# Patient Record
Sex: FEMALE | Race: WHITE | Employment: OTHER | URBAN - METROPOLITAN AREA
[De-identification: names, ages, dates, MRNs, and addresses within clinical notes are randomized per-mention and may not be internally consistent; named-entity substitution may affect disease eponyms.]

---

## 2018-08-21 ENCOUNTER — TELEPHONE (OUTPATIENT)
Dept: CARDIOLOGY CLINIC | Age: 23
End: 2018-08-21

## 2018-08-21 NOTE — TELEPHONE ENCOUNTER
Faxed records request to [facility]. Future Appointments  Date Time Provider [facility]   8/22/2018 9:40 AM Kayla Blanchard  [address] 8/31/2018 8:00 AM Kacey Cid MD [address]      8/22/18- Received records.

## 2018-08-22 ENCOUNTER — TELEPHONE (OUTPATIENT)
Dept: CARDIOLOGY CLINIC | Age: 23
End: 2018-08-22

## 2018-08-22 ENCOUNTER — OFFICE VISIT (OUTPATIENT)
Dept: CARDIOLOGY CLINIC | Age: 23
End: 2018-08-22

## 2018-08-22 VITALS
BODY MASS INDEX: 23.35 KG/M2 | SYSTOLIC BLOOD PRESSURE: 116 MMHG | HEART RATE: 89 BPM | OXYGEN SATURATION: 98 % | HEIGHT: 63 IN | DIASTOLIC BLOOD PRESSURE: 78 MMHG | WEIGHT: 131.8 LBS

## 2018-08-22 DIAGNOSIS — R00.0 TACHYCARDIA: ICD-10-CM

## 2018-08-22 DIAGNOSIS — R06.02 SOB (SHORTNESS OF BREATH): Primary | ICD-10-CM

## 2018-08-22 RX ORDER — RIZATRIPTAN BENZOATE 10 MG/1
TABLET, ORALLY DISINTEGRATING ORAL
COMMUNITY
Start: 2018-07-01 | End: 2019-04-24 | Stop reason: SDUPTHER

## 2018-08-22 RX ORDER — NORETHINDRONE ACETATE AND ETHINYL ESTRADIOL 5-7-9-7
KIT ORAL
COMMUNITY
Start: 2018-06-25

## 2018-08-22 NOTE — PROGRESS NOTES
HISTORY OF PRESENT ILLNESS  Johnny Pizano is a 26 y.o. female. She is here for evaluation of shortness of breath and fast heart rate. She is a nursing student at [employer]. She has had repeat concussions and had her fourth concussion on 8/5/18 when she bumped heads with a friend at camp. She had headaches and dizziness after. She was diagnosed as having a mild concussion without a CT scan. She was told to lie in a dark room which she has been doing for the past week and a half. Several days ago, she felt winded and faint. Her heart rate was fast at 105-120. She went to [facility] and drove herself there. I have reviewed all of the records from there. Her heart rate was as fast as 152 beats per minute in what appeared to be sinus tachycardia. In retrospect, she thinks she has been having a fast heart rate and shortness of breath for about a month. She denies anxiety. She had been athletic and runs up to three miles at a time. She denies a family history of heart disease. She does not smoke cigarettes. She drinks alcohol on occasion. She had a CT scan done at [facility] which showed no evidence for pulmonary embolism. She received two liters of IV fluid with gradual slowing of her heart rate there though she denies dehydration. HPI  Patient Active Problem List   Diagnosis Code    Tachycardia R00.0    SOB (shortness of breath) R06.02     Current Outpatient Prescriptions   Medication Sig Dispense Refill    TRI-LEGEST FE 1-20(5)/1-30(7) /1mg-35mcg (9) tab       rizatriptan (MAXALT-MLT) 10 mg disintegrating tablet        Past Medical History:   Diagnosis Date    ACL (anterior cruciate ligament) tear     Concussion      Past Surgical History:   Procedure Laterality Date    HX ACL RECONSTRUCTION         Review of Systems   Respiratory: Positive for shortness of breath. Cardiovascular: Positive for palpitations. Neurological: Positive for dizziness and headaches.    All other systems reviewed and are negative. Visit Vitals    /78 (BP 1 Location: Right arm, BP Patient Position: Sitting)    Pulse 89    Ht 5' 3\" (1.6 m)    Wt 131 lb 12.8 oz (59.8 kg)    SpO2 98%    BMI 23.35 kg/m2       Physical Exam   Constitutional: She is oriented to person, place, and time. She appears well-nourished. HENT:   Head: Atraumatic. Eyes: Conjunctivae are normal.   Neck: Neck supple. Cardiovascular: Normal rate, regular rhythm and normal heart sounds. Exam reveals no gallop and no friction rub. No murmur heard. Pulmonary/Chest: Breath sounds normal. She has no wheezes. Abdominal: Bowel sounds are normal.   Musculoskeletal: She exhibits no edema. Neurological: She is oriented to person, place, and time. Skin: Skin is dry. Psychiatric: Her behavior is normal.   Nursing note and vitals reviewed. ASSESSMENT and PLAN  I doubt there is any significant underlying heart disease but it is puzzling why her heart rate goes fast and persisted in a fast manner despite IV fluids. She could have sinus node re-entry with automatic tachycardia. She would like to get to the bottom of this so I will give her a 30-day event monitor and will also have her undergo an echocardiogram and call her regarding the results. Follow up will be scheduled as necessary.

## 2018-08-22 NOTE — MR AVS SNAPSHOT
[address] [facility] [address] [facility] 
[phone] Patient: Linda Brown MRN: [id number] :1995 Visit Information Date & Time Provider Department Dept. Phone Encounter #  
 2018  9:40 AM Machelle Lyons MD [facility] [place] [phone] [id number] Your Appointments 2018  8:00 AM  
New Patient with Allen Matson MD  
[facility] [address]) Appt Note: New Pt/ Pt Has Headaches [address] [facility], 
[address] [facility], [address] [phone]  
[address], [address], [address] [phone] Upcoming Health Maintenance Date Due  
 HPV Age 9Y-34Y (1 of 3 - Female 3 Dose Series) 2007 DTaP/Tdap/Td series (1 - Tdap) 2017 PAP AKA CERVICAL CYTOLOGY 2017 Influenza Age 5 to Adult 2018 Allergies as of 2018  Review Complete On: 2018 By: Rocael Jeong Severity Noted Reaction Type Reactions Sulfa (Sulfonamide Antibiotics)  2018    Rash Current Immunizations  Never Reviewed No immunizations on file. Not reviewed this visit Vitals BP Pulse Height(growth percentile) Weight(growth percentile) SpO2 BMI  
 116/78 (BP 1 Location: Right arm, BP Patient Position: Sitting) 89 5' 3\" (1.6 m) 131 lb 12.8 oz (59.8 kg) 98% 23.35 kg/m2 Smoking Status Never Smoker Vitals History BMI and BSA Data Body Mass Index Body Surface Area  
 23.35 kg/m 2 1.63 m 2 Your Updated Medication List  
  
   
This list is accurate as of 18 10:15 AM.  Always use your most recent med list.  
  
  
  
  
 rizatriptan 10 mg disintegrating tablet Commonly known as:  MAXALT-MLT  
  
 TRI-LEGEST FE 1-20(5)/1-30(7) /1mg-35mcg (9) Tab Generic drug:  norethindrone-e estradiol-iron Introducing [facility]! [employer] introduces [employer] patient portal. Now you can access parts of your medical record, email your doctor's office, and request medication refills online. 1. In your internet browser, go to [email or web address] 2. Click on the First Time User? Click Here link in the Sign In box. You will see the New Member Sign Up page. 3. Enter your [employer] Access Code exactly as it appears below. You will not need to use this code after youve completed the sign-up process. If you do not sign up before the expiration date, you must request a new code. · [employer] Access Code: [email or web address] Expires: 11/20/2018 10:15 AM 
 
4. Enter the last four digits of your Social Security Number (xxxx) and Date of Birth (mm/dd/yyyy) as indicated and click Submit. You will be taken to the next sign-up page. 5. Create a [employer] ID. This will be your [employer] login ID and cannot be changed, so think of one that is secure and easy to remember. 6. Create a [employer] password. You can change your password at any time. 7. Enter your Password Reset Question and Answer. This can be used at a later time if you forget your password. 8. Enter your e-mail address. You will receive e-mail notification when new information is available in [address] 9. Click Sign Up. You can now view and download portions of your medical record. 10. Click the Download Summary menu link to download a portable copy of your medical information. If you have questions, please visit the Frequently Asked Questions section of the [employer] website. Remember, [employer] is NOT to be used for urgent needs. For medical emergencies, dial 911. Now available from your iPhone and Android! Please provide this summary of care documentation to your next provider. If you have any questions after today's visit, please call [phone].

## 2018-08-22 NOTE — TELEPHONE ENCOUNTER
Dr. Kasia Collazo would like to have a 30 day event monitor mailed to patient. Dx: fast heart rate, SOB. Thanks.

## 2018-08-31 ENCOUNTER — OFFICE VISIT (OUTPATIENT)
Dept: NEUROLOGY | Age: 23
End: 2018-08-31

## 2018-08-31 VITALS
OXYGEN SATURATION: 98 % | DIASTOLIC BLOOD PRESSURE: 70 MMHG | WEIGHT: 132 LBS | HEIGHT: 63 IN | BODY MASS INDEX: 23.39 KG/M2 | SYSTOLIC BLOOD PRESSURE: 120 MMHG | HEART RATE: 82 BPM

## 2018-08-31 DIAGNOSIS — R53.83 MALAISE AND FATIGUE: ICD-10-CM

## 2018-08-31 DIAGNOSIS — G43.709 CHRONIC MIGRAINE WITHOUT AURA WITHOUT STATUS MIGRAINOSUS, NOT INTRACTABLE: ICD-10-CM

## 2018-08-31 DIAGNOSIS — R42 DIZZINESS AND GIDDINESS: ICD-10-CM

## 2018-08-31 DIAGNOSIS — R53.81 MALAISE AND FATIGUE: ICD-10-CM

## 2018-08-31 DIAGNOSIS — S06.0X0A CONCUSSION WITHOUT LOSS OF CONSCIOUSNESS, INITIAL ENCOUNTER: Primary | ICD-10-CM

## 2018-08-31 RX ORDER — MECLIZINE HYDROCHLORIDE 25 MG/1
25 TABLET ORAL
Qty: 30 TAB | Refills: 3 | Status: SHIPPED | OUTPATIENT
Start: 2018-08-31 | End: 2018-09-10

## 2018-08-31 NOTE — PATIENT INSTRUCTIONS
Office Policies  o Phone calls/patient messages:  Please allow up to 24 hours for someone in the office to contact you about your call or message. Be mindful your provider may be out of the office or your message may require further review. We encourage you to use [employer] [facility] for your messages as this is a faster, more efficient way to communicate with our office  o Medication Refills:  Prescription medications require up to 48 business hours to process. We encourage you to use [employer] for your refills. For controlled medications: Please allow up to 72 business hours to process. Certain medications may require you to  a written prescription at our office. NO narcotic/controlled medications will be prescribed after 4pm Monday through Friday or on weekends  o Form/Paperwork Completion:  Please note there is a $25 fee for all paperwork completed by our providers. We ask that you allow 7-14 business days. Pre-payment is due prior to picking up/faxing the completed form. You may also download your forms to [employer] to have your doctor print off.  o Test Results: In order for a patient to obtain test results, an appointment must be made with the physician to review the results. Test results cannot be discussed over the phone.

## 2018-08-31 NOTE — PROGRESS NOTES
NEUROLOGY HISTORY AND PHYSICAL    Name Leonid Alvarado Age 26 y.o. MRN [id number]  1995     Referring Physician: No primary care provider on file. Chief Complaint:  concussion     This is a 26 y.o. four concussion. 1st concussion 2015. Right temple developed nausea fatigue. Recovered a month later. Second concussion 2017 hit her head on break fireplace no LOC. She had toruble with vision, brain fog, balance issues and fatigue, no tinnitus. Recovered. 2017 she hit her right head against a shoulder of a player while diving for a ball. She became sonophobic, nauseous, needed a full month to recover. August fourth had a fourth concussion she was hit in the back of the head and developed nausea. She continues to struggle with some fatigue and dizziness. She has no visual issues. She feels she is improving. Assessment and Plan  1. Concussion without loss of consciousness, initial encounter  · Discussed the overwhelmingly favorable prognosis of concussion and the transient nature of the symptoms in well over 90% of the cases  · Discussed the necessity of obtaining a head imaging and EEG for evaluation of concussion  · Discussed the importance of avoiding head trauma during the recovery    - meclizine (ANTIVERT) 25 mg tablet; Take 1 Tab by mouth three (3) times daily as needed for up to 10 days. Dispense: 30 Tab; Refill: 3    2. Dizziness and giddiness  - meclizine (ANTIVERT) 25 mg tablet; Take 1 Tab by mouth three (3) times daily as needed for up to 10 days. Dispense: 30 Tab; Refill: 3    3. Chronic migraine without aura without status migrainosus, not intractable  Continue maxalt as needed.  Current frequency once a month    4. Malaise and fatigue  Avoid excessive activity while recovering from concussion      Patient Allergies  Sulfa (sulfonamide antibiotics)     Current Outpatient Prescriptions   Medication Sig    TRI-LEGEST FE 1-20(5)/1-30(7) /1mg-35mcg (9) tab     rizatriptan (MAXALT-MLT) 10 mg disintegrating tablet      No current facility-administered medications for this visit. Past Medical History:   Diagnosis Date    ACL (anterior cruciate ligament) tear     Concussion        Social History   Substance Use Topics    Smoking status: Never Smoker    Smokeless tobacco: Never Used    Alcohol use No       Family History   Problem Relation Age of Onset    Breast Cancer Mother      Review of Systems   Constitutional: Negative for chills and fever. HENT: Negative for ear pain. Eyes: Negative for pain and discharge. Respiratory: Negative for cough and hemoptysis. Cardiovascular: Negative for chest pain and claudication. Gastrointestinal: Negative for constipation and diarrhea. Genitourinary: Negative for flank pain and hematuria. Musculoskeletal: Negative for back pain and myalgias. Skin: Negative for itching and rash. Neurological: Negative for headaches. Endo/Heme/Allergies: Negative for environmental allergies. Does not bruise/bleed easily. Psychiatric/Behavioral: Negative for depression and hallucinations. Exam  Visit Vitals    /70    Pulse 82    Ht 5' 3\" (1.6 m)    Wt 132 lb (59.9 kg)    SpO2 98%    BMI 23.38 kg/m2      General: Well developed, well nourished. Patient in no apparent distress   Head: Normocephalic, atraumatic, anicteric sclera   Neck Normal ROM, No thyromegally   Lungs:  Clear to auscultation bilaterally, No wheezes or rubs   Cardiac: Regular rate and rhythm with no murmurs. Abd: Bowel sounds were audible. No tenderness on palpation   Ext: No pedal edema   Skin: Supple no rash     NeurologicExam:  Mental Status: Alert and oriented to person place and time   Speech: Fluent no aphasia or dysarthria. Cranial Nerves:  II - XII Intact   Motor:  Full and symmetric strength of upper and lower proximal and distal muscles. Normal bulk and tone. Reflexes:   Deep tendon reflexes 2+/4 and symmetric.    Sensory:   Symmetric and intact with no perceived deficits modalities involving small or large fibers. Gait:  Gait is balanced and fluid with normal arm swing. Tremor:   No tremor noted. Cerebellar:  Coordination intact. Neurovascular: No carotid bruits.  No JVD

## 2018-09-14 ENCOUNTER — TELEPHONE (OUTPATIENT)
Dept: CARDIOLOGY CLINIC | Age: 23
End: 2018-09-14

## 2018-09-14 NOTE — TELEPHONE ENCOUNTER
Event monitor ordered by Dr Gabbie Gómez. Per Elvira monitor mailed to patient but never used. Monitor mailed back to [employer]. Appointment cancelled and no change placed.

## 2018-10-02 ENCOUNTER — HOSPITAL ENCOUNTER (OUTPATIENT)
Dept: PHYSICAL THERAPY | Age: 23
Discharge: HOME | End: 2018-10-02
Attending: GENERAL ACUTE CARE HOSPITAL
Payer: COMMERCIAL

## 2018-10-02 PROCEDURE — 97110 THERAPEUTIC EXERCISES: CPT

## 2018-10-02 PROCEDURE — 97161 PT EVAL LOW COMPLEX 20 MIN: CPT

## 2018-10-02 NOTE — PROGRESS NOTES
PT INITIAL EVALUATION NOTE - [facility] 2-15 Patient Name: Erna Dias Date:10/2/2018 : 1995 [x]  Patient  Verified Payor: /   
In time:1:10pm80  Out time:2:30pm 
Total Treatment Time (min): 15 Total Timed Codes (min): 15 
1:1 Treatment Time (min): 15 Visit #: 1 Treatment Area: Concussion [S06.0X9A] Neck pain [M54.2] SUBJECTIVE Pain Level (0-10 scale): 2/10 Any medication changes, allergies to medications, adverse drug reactions, diagnosis change, or new procedure performed?: [] No    [x] Yes (see summary sheet for update) Subjective:   
Pt reports history of 4 concussions in the past 4 years- most recent was 2018- pt was struck on left side of head while giving someone a hug. Pt did not notice symptoms immediately but felt dizziness, fatigue and increased frequency of HAs (has migraines since 1st concussion). 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 2015, hit front of head on a friend's clavicle; 2017, hit back of head on brick wall; 2017, hit right side of head on a friend's shoulder. All previous concussions spontaneously got better without 3-4 weeks. Pt adhered to neurologist recommendation for cognitive rest for 10 days. 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 Pt denies any difficulty with school. Primary complaint is dizziness, especially when lying down or extending head. 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 \" 
Barriers: previous concussion Motivation: high Substance use: none FABQ Score: low OBJECTIVE/EXAMINATION Observations: 
Posture: rounded shoulders Gait: unremarkable Functional Mobility: unremarkable Palpation: tenderness to palpation base of occiput, bilateral UTs Cervical AROM: full, pain-free Strength: 
UE: Grossly 5/5 LE: Grossly 5/5 Vision: 
 Spontaneous Nystagmus: negative Gaze Hold Nystagmus: negative Occulomotor control (H pattern): negative Smooth Pursuit (H pattern): negative Convergence: positive Saccades (shift eyes bw 2 targets): positive Visual Acuity: NT  
 Gaze stabilization (hold eyes on stable target while moving head): positive (horizontal > vertical) Skew Eye Deviation: NT Vestibular Function: VOR: slow head movements: positive VOR: fast head movements: positive Head Thrust: positive Cerebellar Function:  
 Finger to nose: negative Pointing/ past pointing: negative Rapid forearm supination/pronation: negative VOR Cancellation: positive Vertebral Artery Test: NT 
BPPV: 
Cornelia Hallpike: positive left Roll Test: NT Head Hang: NT  
Cervical Tests: 
 Alar Ligament Test: negative Sharp Abida Test: negative Traction Test: negative Neck Torsion Test: negative Smooth Pursuit Neck Torsion Test: negative Balance Tests: BOSSMAN Test (number of mistakes listed below)= 2 Non-compliant surface (blue foam) Feet Together:  EC 0 Single Leg  EC  L 0 Rhomberg:  EC 0 Compliant Surface Feet Together: EC 0 Single Leg   L 1 Rhomberg:  EC 1 Mobility Assessment: unremarkable 10 min Therapeutic Exercise:  [x] See flow sheet :  
Rationale: improve coordination and improve balance and decrease dizziness With 
 [] TE 
 [] TA 
 [] neuro 
 [] other: Patient Education: [x] Review HEP- pt given  
[] Progressed/Changed HEP based on:  
[] positioning   [] body mechanics   [] transfers   [] heat/ice application   
[] other:   
 
Other Objective/Functional Measures: FOTO: 58/100 Pain Level (0-10 scale) post treatment: 0/10 ASSESSMENT/Changes in Function:  
Pt is a 26year old female who presents to PT by [facility] for post-concussion syndrome; however, signs and symptoms are consistent with left BPPV vs concussion. Baseline diplopia and HAs. Static balance is good on even and uneven surfaces. Pt treated with Epley maneuver and reported definite improvement upon leaving. Pt given handout for HEP- Epley maneuver at home 3x/day until symptom-free. Patient will benefit from skilled PT services to modify and progress therapeutic interventions, address functional mobility deficits, analyze and address soft tissue restrictions, analyze and cue movement patterns, analyze and modify body mechanics/ergonomics, assess and modify postural abnormalities and address imbalance/dizziness to attain pt/PT goals. [x]  See Plan of Care Eduardo Vasquez, PT, DPT  10/2/2018  1:11 PM

## 2018-10-02 NOTE — PROGRESS NOTES
[facility] Physical Therapy [facility], [address] [place], [address] Phone: [phone]  Fax: [phone] Plan of Care/Statement of Necessity for Physical Therapy Services  2-15 Patient name: Johnathon Elizabeth  : 1995  Provider#: [id number] Referral source: [facility], Not On File, MD     
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 
 
Start of Care: 10/02/2018      Onset Date: 2018 The Plan of Care and following information is based on the information from the initial evaluation. Assessment/ key information: Pt is a 26year old female who presents to PT by [facility] for post-concussion syndrome; however, signs and symptoms are consistent with left BPPV vs concussion. Baseline diplopia and HAs. Static balance is good on even and uneven surfaces. Pt treated with Epley maneuver and reported definite improvement upon leaving initial visit. Pt given handout for HEP- Epley maneuver at home 3x/day until symptom-free. Patient will benefit from skilled PT services to modify and progress therapeutic interventions, address functional mobility deficits, analyze and address soft tissue restrictions, analyze and cue movement patterns, analyze and modify body mechanics/ergonomics, assess and modify postural abnormalities and address imbalance/dizziness to attain pt/PT goals.  
 
Evaluation Complexity History MEDIUM  Complexity : 1-2 comorbidities / personal factors will impact the outcome/ POC ; Examination HIGH Complexity : 4+ Standardized tests and measures addressing body structure, function, activity limitation and / or participation in recreation ;Presentation LOW Complexity : Stable, uncomplicated  ;Clinical Decision Making MEDIUM Complexity : FOTO score of 26-74 Overall Complexity Rating: LOW Problem List: pain affecting function, impaired gait/ balance, decrease ADL/ functional abilitiies, decrease activity tolerance and decrease transfer abilities Treatment Plan may include any combination of the following: Therapeutic exercise, Therapeutic activities, Neuromuscular re-education, Physical agent/modality, Gait/balance training, Manual therapy and Patient education Patient / Family readiness to learn indicated by: asking questions, trying to perform skills and interest 
Persons(s) to be included in education: patient (P) Barriers to Learning/Limitations: None Patient Goal (s): Back to all normal life activities, elimination of symptoms.  Patient Self Reported Health Status: excellent Rehabilitation Potential: good Short/Long Term Goals: To be accomplished in 6 weeks: 
1) Independent with HEP. 2) Tolerate return to physical activity without symptoms. 3) Pt will report resolution of dizziness. 4) Pt will report improvement in overall functional mobility, as measured by FOTO, with an increased score of at least 17 points, from 58 to 75. Frequency / Duration: Patient to be seen 2 times per week for 6 weeks. Patient/ Caregiver education and instruction: self care, activity modification and exercises 
 
[x]  Plan of care has been reviewed with LUIS Vasquez PT, DPT  10/2/2018 1:12 PM 
 
________________________________________________________________________ I certify that the above Therapy Services are being furnished while the patient is under my care. I agree with the treatment plan and certify that this therapy is necessary. [de-identified] Signature:____________________  Date:____________Time: _________

## 2018-10-08 ENCOUNTER — HOSPITAL ENCOUNTER (OUTPATIENT)
Dept: PHYSICAL THERAPY | Age: 23
Discharge: HOME | End: 2018-10-08
Attending: GENERAL ACUTE CARE HOSPITAL
Payer: COMMERCIAL

## 2018-10-08 PROCEDURE — 97110 THERAPEUTIC EXERCISES: CPT | Performed by: PHYSICAL THERAPIST

## 2018-10-08 PROCEDURE — 97140 MANUAL THERAPY 1/> REGIONS: CPT | Performed by: PHYSICAL THERAPIST

## 2018-10-08 NOTE — PROGRESS NOTES
PT DAILY TREATMENT NOTE - [facility] 2-15 Patient Name: Nilda Valdovinos Date:10/8/2018 : 1995 [x]  Patient  Verified Payor: BLUE CROSS / Plan: [facility] PPO / Product Type: PPO / In time:135p  Out time:235p Total Treatment Time (min): 60 Total Timed Codes (min): 60 
1:1 Treatment Time ([address] only): 60 Visit #: 2 Treatment Area: Concussion [S06.0X9A] Neck pain [M54.2] SUBJECTIVE Pain Level (0-10 scale): 1/10 Any medication changes, allergies to medications, adverse drug reactions, diagnosis change, or new procedure performed?: [x] No    [] Yes (see summary sheet for update) Subjective functional status/changes:   [] No changes reported \"Dizziness is like I am on a ship not the room spinning\". The home vertigo exercises made her worse. She stopped them after 2 days. She is now back to where she was before her fist session. Yoga was helpful. OBJECTIVE 30 min Therapeutic Exercise:  [x] See flow sheet : review of current exercise and fitness activities and recommendations Rationale: increase ROM, increase strength, improve coordination, improve balance and increase proprioception to improve the patients ability to resume normal activity 30 min Manual Therapy: assessment of cervical mobilitySTM bilateral UT, cervical paraspinal and sub-occipital regions Rationale: decrease pain, increase ROM and increase tissue extensibility to improve the patients ability to resume physical activity and look over shoulders With 
 [] TE 
 [] TA 
 [] neuro 
 [] other: Patient Education: [x] Review HEP [] Progressed/Changed HEP based on:  
[] positioning   [] body mechanics   [] transfers   [] heat/ice application   
[] other:   
 
Other Objective/Functional Measures: AROM Cervical Rotation R 72  L 60  SB R 35   L 35;   
 
Pain Level (0-10 scale) post treatment: 0/10 ASSESSMENT/Changes in Function: Based on her response to HEP Epley activity and limitation present in cervical mobility we shifted to cervicogenic origin focus with success today. Pt reported less headache and neck pain after treatment. Patient will continue to benefit from skilled PT services to modify and progress therapeutic interventions, address functional mobility deficits, address ROM deficits, address strength deficits, analyze and address soft tissue restrictions, analyze and cue movement patterns, analyze and modify body mechanics/ergonomics and assess and modify postural abnormalities to attain remaining goals. []  See Plan of Care 
[]  See progress note/recertification 
[]  See Discharge Summary Progress towards goals / Updated goals: 
Short/Long Term Goals: To be accomplished in 6 weeks: 
1) Independent with HEP. 2) Tolerate return to physical activity without symptoms. 3) Pt will report resolution of dizziness. 4) Pt will report improvement in overall functional mobility, as measured by FOTO, with an increased score of at least 17 points, from 58 to 75. 
  
 
PLAN [x]  Upgrade activities as tolerated     [x]  Continue plan of care 
[]  Update interventions per flow sheet      
[]  Discharge due to:_ 
[]  Other:_   
 
Carolin Calderon PT, DPT 10/8/2018  1:37 PM

## 2018-10-12 ENCOUNTER — HOSPITAL ENCOUNTER (OUTPATIENT)
Dept: PHYSICAL THERAPY | Age: 23
Discharge: HOME | End: 2018-10-12
Attending: GENERAL ACUTE CARE HOSPITAL
Payer: COMMERCIAL

## 2018-10-12 PROCEDURE — 97140 MANUAL THERAPY 1/> REGIONS: CPT | Performed by: PHYSICAL THERAPIST

## 2018-10-12 PROCEDURE — 97110 THERAPEUTIC EXERCISES: CPT | Performed by: PHYSICAL THERAPIST

## 2018-10-12 NOTE — PROGRESS NOTES
PT DAILY TREATMENT NOTE - [facility] 2-15 Patient Name: Joel Addison Date:10/12/2018 : 1995 [x]  Patient  Verified Payor: BLUE CROSS / Plan: [facility] PPO / Product Type: PPO / In time:800a  Out time:845a Total Treatment Time (min): 45 Total Timed Codes (min): 30 
1:1 Treatment Time ( only): 30 Visit #: 3 Treatment Area: Concussion [S06.0X9A] Neck pain [M54.2] SUBJECTIVE Pain Level (0-10 scale): 0/10 Any medication changes, allergies to medications, adverse drug reactions, diagnosis change, or new procedure performed?: [x] No    [] Yes (see summary sheet for update) Subjective functional status/changes:   [] No changes reported Pt states that she did have more dizziness after last session. The day after she felt good and has done well since. \"Yesterday she felt the best she has felt yet. \"  The mor she turns her head the more she feels dizzy. OBJECTIVE 
 
 
  
30 min Therapeutic Exercise:  [x] See flow sheet : review of current exercise and fitness activities and recommendations Rationale: increase ROM, increase strength, improve coordination, improve balance and increase proprioception to improve the patients ability to resume normal activity 
  
  
15 min Manual Therapy: assessment of cervical mobilitySTM bilateral UT, cervical paraspinal and sub-occipital regions, right C5/6 and C6/7 upslide and left C4/C5 downslide mob grade 3-4 Rationale: decrease pain, increase ROM and increase tissue extensibility to improve the patients ability to resume physical activity and look over shoulders 
  
   
With 
 [] TE 
 [] TA 
 [] neuro 
 [] other: Patient Education: [x] Review HEP [] Progressed/Changed HEP based on:  
[] positioning   [] body mechanics   [] transfers   [] heat/ice application   
[] other:   
  
Other Objective/Functional Measures: AROM Cervical Rotation R 80  L 72  SB R 35   L 35;              
  
Pain Level (0-10 scale) post treatment: 0/10 
  
 ASSESSMENT/Changes in Function:  
Cervical mobility improved following manual treatment. Instructed her in self right facet upslide mob which also helped. If she does tolerate the cervical exercise well then consider visual training next session. Patient will continue to benefit from skilled PT services to modify and progress therapeutic interventions, address functional mobility deficits, address ROM deficits, address strength deficits, analyze and address soft tissue restrictions, analyze and cue movement patterns, analyze and modify body mechanics/ergonomics and assess and modify postural abnormalities to attain remaining goals. 
   
[]  See Plan of Care 
[]  See progress note/recertification 
[]  See Discharge Summary 
   
Progress towards goals / Updated goals: 
Short/Long Term Goals: To be accomplished in 6 weeks: 
1) Independent with HEP. 2) Tolerate return to physical activity without symptoms. 3) Pt will report resolution of dizziness. 4) Pt will report improvement in overall functional mobility, as measured by FOTO, with an increased score of at least 17 points, from 58 to 75. 
  
  
PLAN [x]  Upgrade activities as tolerated     [x]  Continue plan of care 
[]  Update interventions per flow sheet      
[]  Discharge due to:_ 
[]  Other:_   
  
 
Amraúl Smoker, PT, DPT 10/12/2018  8:02 AM

## 2018-10-16 ENCOUNTER — HOSPITAL ENCOUNTER (OUTPATIENT)
Dept: PHYSICAL THERAPY | Age: 23
Discharge: HOME | End: 2018-10-16
Attending: GENERAL ACUTE CARE HOSPITAL
Payer: COMMERCIAL

## 2018-10-16 PROCEDURE — 97140 MANUAL THERAPY 1/> REGIONS: CPT

## 2018-10-16 PROCEDURE — 97110 THERAPEUTIC EXERCISES: CPT

## 2018-10-16 NOTE — PROGRESS NOTES
PT DAILY TREATMENT NOTE - [facility] 2-15 Patient Name: Donna Ferro Date:10/16/2018 : 1995 [x]  Patient  Verified Payor: BLUE CROSS / Plan: [facility] PPO / Product Type: PPO / In time:800a  Out time:845a Total Treatment Time (min): 45 Total Timed Codes (min): 30 
1:1 Treatment Time ( only): 30 Visit #: 3 Treatment Area: Concussion [S06.0X9A] Neck pain [M54.2] SUBJECTIVE Pain Level (0-10 scale): 0/10 Any medication changes, allergies to medications, adverse drug reactions, diagnosis change, or new procedure performed?: [x] No    [] Yes (see summary sheet for update) Subjective functional status/changes:   [] No changes reported Pt reports continued improvement in dizziness. Pt able to jog 1/2 mile without difficulty. Pt feels that yoga and postural exercises are helping. Pt maintains that dizziness is worst when she lies down in the bed at night. OBJECTIVE 30 min Therapeutic Exercise:  [x] See flow sheet : review of current exercise and fitness activities and recommendations Rationale: increase ROM, increase strength, improve coordination, improve balance and increase proprioception to improve the patients ability to resume normal activity 
  
15 min Manual Therapy: STM bilateral UT, cervical paraspinal and sub-occipital regions, gentle cervical traction; suboccipital release, right SCM trigger point release Rationale: decrease pain, increase ROM and increase tissue extensibility to improve the patients ability to resume physical activity and look over shoulders 
  
With 
 [] TE 
 [] TA 
 [] neuro 
 [] other: Patient Education: [x] Review HEP [] Progressed/Changed HEP based on:  
[] positioning   [] body mechanics   [] transfers   [] heat/ice application   
[] other:   
  
Other Objective/Functional Measures: --         
  
Pain Level (0-10 scale) post treatment: 0/10 
  
ASSESSMENT/Changes in Function: Recommend pt sleep in recliner or propped on at least 2 pillows to help with dizziness at night. Added SCM stretch to HEP. Pt reported feeling significantly better after manual and cervical exercises. Patient will continue to benefit from skilled PT services to modify and progress therapeutic interventions, address functional mobility deficits, address ROM deficits, address strength deficits, analyze and address soft tissue restrictions, analyze and cue movement patterns, analyze and modify body mechanics/ergonomics and assess and modify postural abnormalities to attain remaining goals. 
   
[]  See Plan of Care 
[]  See progress note/recertification 
[]  See Discharge Summary 
   
Progress towards goals / Updated goals: 
Short/Long Term Goals: To be accomplished in 6 weeks: 
1) Independent with HEP. MET 
2) Tolerate return to physical activity without symptoms. progressing 3) Pt will report resolution of dizziness. 4) Pt will report improvement in overall functional mobility, as measured by FOTO, with an increased score of at least 17 points, from 58 to 75. 
  
PLAN [x]  Upgrade activities as tolerated     [x]  Continue plan of care 
[]  Update interventions per flow sheet      
[]  Discharge due to:_ 
[]  Other:_   
  
Barrera Vasquez, PT, DPT  10/16/2018  8:02 AM

## 2018-10-19 ENCOUNTER — HOSPITAL ENCOUNTER (OUTPATIENT)
Dept: PHYSICAL THERAPY | Age: 23
Discharge: HOME | End: 2018-10-19
Attending: GENERAL ACUTE CARE HOSPITAL
Payer: COMMERCIAL

## 2018-10-19 PROCEDURE — 97110 THERAPEUTIC EXERCISES: CPT | Performed by: PHYSICAL THERAPIST

## 2018-10-19 PROCEDURE — 97140 MANUAL THERAPY 1/> REGIONS: CPT | Performed by: PHYSICAL THERAPIST

## 2018-10-19 NOTE — PROGRESS NOTES
PT DAILY TREATMENT NOTE - [facility] 2-15 Patient Name: Gigi Pérez Date:10/19/2018 : 1995 [x]  Patient  Verified Payor: BLUE CROSS / Plan: [facility] PPO / Product Type: PPO / In time:800a  Out time:915a Total Treatment Time (min): 45 Total Timed Codes (min): 45 
1:1 Treatment Time ([address] only): 30 Visit #: 0 Treatment Area: Concussion [S06.0X9A] Neck pain [M54.2] SUBJECTIVE Pain Level (0-10 scale): 3/10 Any medication changes, allergies to medications, adverse drug reactions, diagnosis change, or new procedure performed?: [x] No    [] Yes (see summary sheet for update) Subjective functional status/changes:   [] No changes reported Pt reports that the last session helped. Today she has some neck pain this morning. Yesterday she had episodes of both UE and LE tremors while at work. She states concern for possible presentation of MS. OBJECTIVE 15 min Therapeutic Exercise:  [x] See flow sheet : review of current exercise and fitness activities and recommendations Rationale: increase ROM, increase strength, improve coordination, improve balance and increase proprioception to improve the patients ability to resume normal activity 
  
30 min Manual Therapy: STM bilateral UT, cervical paraspinal and sub-occipital regions, gentle cervical traction; suboccipital release, right SCM trigger point release, passive cervical rotation right and left Rationale: decrease pain, increase ROM and increase tissue extensibility to improve the patients ability to resume physical activity and look over shoulders 
  
With 
 [] TE 
 [] TA 
 [] neuro 
 [] other: Patient Education: [x] Review HEP   
[] Progressed/Changed HEP based on:  
[] positioning   [] body mechanics   [] transfers   [] heat/ice application   
[] other:   
  
Other Objective/Functional Measures: --         
  
Pain Level (0-10 scale) post treatment: 0/10 
  
ASSESSMENT/Changes in Function:  
 Improved cervical rotation reducing pain at end range. Continue to focus treatment for cervicogenic origin as tolerated. Patient will continue to benefit from skilled PT services to modify and progress therapeutic interventions, address functional mobility deficits, address ROM deficits, address strength deficits, analyze and address soft tissue restrictions, analyze and cue movement patterns, analyze and modify body mechanics/ergonomics and assess and modify postural abnormalities to attain remaining goals. 
   
[]  See Plan of Care 
[]  See progress note/recertification 
[]  See Discharge Summary 
   
Progress towards goals / Updated goals: 
Short/Long Term Goals: To be accomplished in 6 weeks: 
1) Independent with HEP. MET 
2) Tolerate return to physical activity without symptoms. progressing 3) Pt will report resolution of dizziness. 4) Pt will report improvement in overall functional mobility, as measured by FOTO, with an increased score of at least 17 points, from 58 to 75. 
  
PLAN [x]  Upgrade activities as tolerated     [x]  Continue plan of care 
[]  Update interventions per flow sheet      
[]  Discharge due to:_ 
[]  Other:_   
 
 
 
Char Patino PT, DPT 10/19/2018  8:05 AM

## 2018-10-26 ENCOUNTER — HOSPITAL ENCOUNTER (OUTPATIENT)
Dept: PHYSICAL THERAPY | Age: 23
Discharge: HOME | End: 2018-10-26
Attending: GENERAL ACUTE CARE HOSPITAL
Payer: COMMERCIAL

## 2018-10-26 PROCEDURE — 97140 MANUAL THERAPY 1/> REGIONS: CPT | Performed by: PHYSICAL THERAPIST

## 2018-10-26 PROCEDURE — 97014 ELECTRIC STIMULATION THERAPY: CPT | Performed by: PHYSICAL THERAPIST

## 2018-10-26 PROCEDURE — 97110 THERAPEUTIC EXERCISES: CPT | Performed by: PHYSICAL THERAPIST

## 2018-10-26 NOTE — PROGRESS NOTES
PT DAILY TREATMENT NOTE - [facility] 2-15 Patient Name: Antony Martinez Date:10/26/2018 : 1995 [x]  Patient  Verified Payor: BLUE CROSS / Plan: [facility] PPO / Product Type: PPO / In [address] [place] [address] Total Treatment Time (min): 75 Total Timed Codes (min): 60 
1:1 Treatment Time ([address] only): 60 Visit #: 6 Treatment Area: Concussion [S06.0X9A] Neck pain [M54.2] SUBJECTIVE Pain Level (0-10 scale): 3/10 Any medication changes, allergies to medications, adverse drug reactions, diagnosis change, or new procedure performed?: [x] No    [] Yes (see summary sheet for update) Subjective functional status/changes:   [] No changes reported Pt states that she has neck stiffness. She also saw Dr. Kirk Craig with [facility] for further evaluation for head/neck pain and dizziness symptoms. She states that Dr. Sergo Tong confirms that she does not seem to have concussion symptoms but rather cervicogenic and potential positional vertigo symptoms. OBJECTIVE Modality rationale: decrease pain to improve the patients ability to look over shoulders Min Type Additional Details 15 [x] Estim: []Att   [x]Unatt    []TENS instruct []IFC  [x]Premod   []NMES []Other:  []w/US   []w/ice   [x]w/heat Position: supine Location: neck/UT []  Traction: [] Cervical       []Lumbar 
                     [] Prone          []Supine []Intermittent   []Continuous Lbs: 
[] before manual 
[] after manual 
[]w/heat  
 []  Ultrasound: []Continuous   [] Pulsed  
                    at: []1MHz   []3MHz Location: 
W/cm2:  
 [] Paraffin Location:  
[]w/heat  
 []  Ice     []  Heat 
[]  Ice massage Position: Location:  
 []  Laser 
[]  Other: Position: Location:  
 
 []  Vasopneumatic Device Pressure:       [] lo [] med [] hi  
Temperature:   
 
[x] Skin assessment post-treatment:  [x]intact []redness- no adverse reaction []redness  adverse reaction:  
 
30 min Therapeutic Exercise:  [x] See flow sheet : review of current exercise and fitness activities and recommendations Rationale: increase ROM, increase strength, improve coordination, improve balance and increase proprioception to improve the patients ability to resume normal activity 
  
30 min Manual Therapy: STM bilateral UT, cervical paraspinal and sub-occipital regions, gentle cervical traction; suboccipital release, right SCM trigger point release, Upslide mobilization grade 3-4 C4/5-C6/7 R and L,  passive cervical rotation right and left Rationale: decrease pain, increase ROM and increase tissue extensibility to improve the patients ability to resume physical activity and look over shoulders 
  
With 
 [] TE 
 [] TA 
 [] neuro 
 [] other: Patient Education: [x] Review HEP   
[] Progressed/Changed HEP based on:  
[] positioning   [] body mechanics   [] transfers   [] heat/ice application   
[] other:   
  
Other Objective/Functional Measures: --         
  
Pain Level (0-10 scale) post treatment: 0/10 
  
ASSESSMENT/Changes in Function:  
Improved cervical rotation reducing pain at end range. Upslide mobilization was effective for advancing ROM.  Therapeutic Exercise was assisted with Fatuma Ozuna PTA.  Patient will continue to benefit from skilled PT services to modify and progress therapeutic interventions, address functional mobility deficits, address ROM deficits, address strength deficits, analyze and address soft tissue restrictions, analyze and cue movement patterns, analyze and modify body mechanics/ergonomics and assess and modify postural abnormalities to attain remaining goals. 
   
[]  See Plan of Care 
[]  See progress note/recertification 
[]  See Discharge Summary 
   
Progress towards goals / Updated goals: 
Short/Long Term Goals: To be accomplished in 6 weeks: 
1) Independent with HEP. MET 
 2) Tolerate return to physical activity without symptoms. progressing 3) Pt will report resolution of dizziness. 4) Pt will report improvement in overall functional mobility, as measured by FOTO, with an increased score of at least 17 points, from 58 to 75. 
  
PLAN [x]  Upgrade activities as tolerated     [x]  Continue plan of care 
[]  Update interventions per flow sheet      
[]  Discharge due to:_ 
[]  Other:_   
 
 
 
Prince Edgar PT, DPT 10/26/2018  1:20 PM

## 2018-10-30 ENCOUNTER — HOSPITAL ENCOUNTER (OUTPATIENT)
Dept: PHYSICAL THERAPY | Age: 23
Discharge: HOME | End: 2018-10-30
Attending: GENERAL ACUTE CARE HOSPITAL
Payer: COMMERCIAL

## 2018-10-30 PROCEDURE — 97140 MANUAL THERAPY 1/> REGIONS: CPT

## 2018-10-30 PROCEDURE — 97014 ELECTRIC STIMULATION THERAPY: CPT

## 2018-10-30 NOTE — PROGRESS NOTES
PT DAILY TREATMENT NOTE - [facility] 2-15 Patient Name: Micaela Seth Date:10/30/2018 : 1995 [x]  Patient  Verified Payor: BLUE CROSS / Plan: [facility] PPO / Product Type: PPO / In time 9:30am  Out time: 10:30am 
Total Treatment Time (min): 60 Total Timed Codes (min): 45 
1:1 Treatment Time ([address] only): 30 Visit #: 6 Treatment Area: Concussion [S06.0X9A] Neck pain [M54.2] SUBJECTIVE Pain Level (0-10 scale): 2/10 Any medication changes, allergies to medications, adverse drug reactions, diagnosis change, or new procedure performed?: [x] No    [] Yes (see summary sheet for update) Subjective functional status/changes:   [] No changes reported Pt reports feeling much better, emotionally, since evaluation with Dr. Kendall Ferraro and last PT session. Pt reports definite decrease in dizziness. OBJECTIVE Modality rationale: decrease pain and increase tissue extensibility to improve the patients ability to look over shoulders Type Additional Details 15[x] Estim: []Att   [x]Unatt    []TENS instruct []IFC  [x]Premod   []NMES []Other:  []w/US   []w/ice   [x]w/heat Position: supine with LEs supported on wedge Location: bilateral UTs at beginning of session []  Traction: [] Cervical       []Lumbar 
                     [] Prone          []Supine []Intermittent   []Continuous Lbs: 
[] before manual 
[] after manual 
[]w/heat  
[]  Ultrasound: []Continuous   [] Pulsed  
                    at: []1MHz   []3MHz Location: 
W/cm2:  
[] Paraffin Location:  
[]w/heat  
[]  Ice     []  Heat 
[]  Ice massage Position: Location:  
[]  Laser 
[]  Other: Position: Location:  
[]  Vasopneumatic Device Pressure:       [] lo [] med [] hi  
Temperature:   
[x] Skin assessment post-treatment:  [x]intact []redness- no adverse reaction 
  []redness  adverse reaction: 15 min Therapeutic Exercise:  [x] See flow sheet : review of current exercise and fitness activities and recommendations Rationale: increase ROM, increase strength, improve coordination, improve balance and increase proprioception to improve the patients ability to resume normal activity 
  
30 min Manual Therapy: STM bilateral UTs and cervical paraspinal; gentle cervical traction; suboccipital release, upslide mobilization grade 3-4 C4/5-C6/7 R and L,  passive cervical rotation and side-bending right and left Rationale: decrease pain, increase ROM and increase tissue extensibility to improve the patients ability to resume physical activity and look over shoulders 
  
With 
 [] TE 
 [] TA 
 [] neuro 
 [] other: Patient Education: [x] Review HEP   
[] Progressed/Changed HEP based on:  
[] positioning   [] body mechanics   [] transfers   [] heat/ice application   
[] other:   
  
Other Objective/Functional Measures: --         
  
Pain Level (0-10 scale) post treatment: 1/10 
  
ASSESSMENT/Changes in Function:  
Pt demonstrates significant difficulty relaxing during manual today, due to writing an email to a friend, giving editing tips for college essay. Pt expressed feeling of frustration and disapproval about the content of her friend's essay, which extremely distracted from participation in treatment session; however, pt reported benefit and Patient will continue to benefit from skilled PT services to modify and progress therapeutic interventions, address functional mobility deficits, address ROM deficits, address strength deficits, analyze and address soft tissue restrictions, analyze and cue movement patterns, analyze and modify body mechanics/ergonomics and assess and modify postural abnormalities to attain remaining goals. 
   
[]  See Plan of Care 
[]  See progress note/recertification 
[]  See Discharge Summary 
   
Progress towards goals / Updated goals: Short/Long Term Goals: To be accomplished in 6 weeks: 
1) Independent with HEP. MET 2) Tolerate return to physical activity without symptoms. progressing 3) Pt will report resolution of dizziness. 4) Pt will report improvement in overall functional mobility, as measured by FOTO, with an increased score of at least 17 points, from 58 to 75. 
  
PLAN [x]  Upgrade activities as tolerated     [x]  Continue plan of care 
[]  Update interventions per flow sheet      
[]  Discharge due to:_ 
[]  Other:_   
 
 
 
Vadim Vasquez, PT 10/30/2018  1:20 PM

## 2018-11-02 ENCOUNTER — HOSPITAL ENCOUNTER (OUTPATIENT)
Dept: PHYSICAL THERAPY | Age: 23
Discharge: HOME | End: 2018-11-02
Attending: GENERAL ACUTE CARE HOSPITAL
Payer: COMMERCIAL

## 2018-11-02 PROCEDURE — 97112 NEUROMUSCULAR REEDUCATION: CPT | Performed by: PHYSICAL THERAPIST

## 2018-11-02 PROCEDURE — 97140 MANUAL THERAPY 1/> REGIONS: CPT | Performed by: PHYSICAL THERAPIST

## 2018-11-02 PROCEDURE — 97014 ELECTRIC STIMULATION THERAPY: CPT | Performed by: PHYSICAL THERAPIST

## 2018-11-02 NOTE — PROGRESS NOTES
PT DAILY TREATMENT NOTE - [facility] 2-15 Patient Name: Kranthi Angelo Date:2018 : 1995 [x]  Patient  Verified Payor: BLUE CROSS / Plan: [place] [facility] PPO / Product Type: PPO / In Dean Richey 66 Total Treatment Time (min): 70 Total Timed Codes (min): 55 
1:1 Treatment Time (MC only): 45 Visit #: 0 Treatment Area: Concussion [S06.0X9A] Neck pain [M54.2] SUBJECTIVE Pain Level (0-10 scale): 0/10 Any medication changes, allergies to medications, adverse drug reactions, diagnosis change, or new procedure performed?: [x] No    [] Yes (see summary sheet for update) Subjective functional status/changes:   [] No changes reported Pt states that she felt really good after last session and that the neck stiffness is improving. Dizziness is improving as well with less intense episodes. OBJECTIVE Modality rationale: decrease pain to improve the patients ability to look over shoulders Min Type Additional Details   
  
15 [x] Estim: []Att   [x]Unatt    []TENS instruct []IFC  [x]Premod   []NMES []Other:  []w/US   []w/ice   [x]w/heat Position: supine Location: neck/UT   
  
  []  Traction: [] Cervical       []Lumbar 
                     [] Prone          []Supine []Intermittent   []Continuous Lbs: 
[] before manual 
[] after manual 
[]w/heat   
  []  Ultrasound: []Continuous   [] Pulsed  
                    at: []1MHz   []3MHz Location: 
W/cm2:   
  [] Paraffin Location:  
[]w/heat   
  []  Ice     []  Heat 
[]  Ice massage Position: Location:   
  []  Laser 
[]  Other: Position: Location:   
  
  []  Vasopneumatic Device Pressure:       [] lo [] med [] hi  
Temperature:    
  
[x] Skin assessment post-treatment:  [x]intact []redness- no adverse reaction 
  []redness  adverse reaction:  
  
25 min Neuromuscular Reeducation: instruction and performance of 1/2 [place] get-up Rationale: increase ROM, increase strength, improve coordination, improve balance and increase proprioception to improve the patients ability to resume normal activity 
  
30 min Manual Therapy: STM bilateral UT, cervical paraspinal and sub-occipital regions, gentle cervical traction; suboccipital release, right SCM trigger point release, Upslide mobilization grade 3-4 C4/5-C6/7 R and L,  passive cervical rotation right and left Rationale: decrease pain, increase ROM and increase tissue extensibility to improve the patients ability to resume physical activity and look over shoulders 
  
With 
 [] TE 
 [] TA 
 [] neuro 
 [] other: Patient Education: [x] Review HEP   
[] Progressed/Changed HEP based on:  
[] positioning   [] body mechanics   [] transfers   [] heat/ice application   
[] other:   
  
Other Objective/Functional Measures: --         
  
Pain Level (0-10 scale) post treatment: 0/10 
  
ASSESSMENT/Changes in Function:  
ROM continues to improve. Achieved full cervical rotation R/L in supine without pain. Advanced to progression of 1/2 [place] get-up to involve body on head movement and coordinate multiple body segments together. Patient will continue to benefit from skilled PT services to modify and progress therapeutic interventions, address functional mobility deficits, address ROM deficits, address strength deficits, analyze and address soft tissue restrictions, analyze and cue movement patterns, analyze and modify body mechanics/ergonomics and assess and modify postural abnormalities to attain remaining goals. 
   
[]  See Plan of Care 
[]  See progress note/recertification 
[]  See Discharge Summary 
   
Progress towards goals / Updated goals: 
Short/Long Term Goals: To be accomplished in 6 weeks: 
1) Independent with HEP. MET 2) Tolerate return to physical activity without symptoms. progressing 3) Pt will report resolution of dizziness. 4) Pt will report improvement in overall functional mobility, as measured by FOTO, with an increased score of at least 17 points, from 58 to 75. 
  
PLAN [x]  Upgrade activities as tolerated     [x]  Continue plan of care 
[]  Update interventions per flow sheet      
[]  Discharge due to:_ 
[]  Other:_   
 
 
 
Columba Lorenzo, PT, DPT 11/2/2018  1:30 PM

## 2018-11-06 ENCOUNTER — OFFICE VISIT (OUTPATIENT)
Dept: NEUROLOGY | Age: 23
End: 2018-11-06

## 2018-11-06 ENCOUNTER — DOCUMENTATION ONLY (OUTPATIENT)
Dept: NEUROLOGY | Age: 23
End: 2018-11-06

## 2018-11-06 ENCOUNTER — HOSPITAL ENCOUNTER (OUTPATIENT)
Dept: PHYSICAL THERAPY | Age: 23
Discharge: HOME | End: 2018-11-06
Payer: COMMERCIAL

## 2018-11-06 VITALS
BODY MASS INDEX: 23.57 KG/M2 | HEIGHT: 63 IN | DIASTOLIC BLOOD PRESSURE: 72 MMHG | RESPIRATION RATE: 18 BRPM | WEIGHT: 133 LBS | HEART RATE: 114 BPM | OXYGEN SATURATION: 97 % | SYSTOLIC BLOOD PRESSURE: 122 MMHG

## 2018-11-06 DIAGNOSIS — R20.0 NUMBNESS AND TINGLING OF RIGHT LEG: ICD-10-CM

## 2018-11-06 DIAGNOSIS — G43.009 MIGRAINE WITHOUT AURA AND WITHOUT STATUS MIGRAINOSUS, NOT INTRACTABLE: ICD-10-CM

## 2018-11-06 DIAGNOSIS — R20.2 NUMBNESS AND TINGLING OF RIGHT LEG: ICD-10-CM

## 2018-11-06 DIAGNOSIS — R25.1 TREMOR: Primary | ICD-10-CM

## 2018-11-06 PROCEDURE — 97014 ELECTRIC STIMULATION THERAPY: CPT

## 2018-11-06 PROCEDURE — 97112 NEUROMUSCULAR REEDUCATION: CPT

## 2018-11-06 PROCEDURE — 97140 MANUAL THERAPY 1/> REGIONS: CPT

## 2018-11-06 RX ORDER — NORTRIPTYLINE HYDROCHLORIDE 10 MG/1
CAPSULE ORAL
Refills: 1 | COMMUNITY
Start: 2018-10-29 | End: 2021-05-29

## 2018-11-06 RX ORDER — PROPRANOLOL HYDROCHLORIDE 10 MG/1
10 TABLET ORAL 2 TIMES DAILY
Qty: 60 TAB | Refills: 5 | Status: SHIPPED | OUTPATIENT
Start: 2018-11-06 | End: 2019-01-07 | Stop reason: SDUPTHER

## 2018-11-06 NOTE — PROGRESS NOTES
Patient here for follow up on concussion and migraines. She reported she has developed a tremor since August in her arms and legs. She also reported she has tingling in her right leg.

## 2018-11-06 NOTE — PROGRESS NOTES
Date:            18 Name:  Hailey Morales 
:  1995 MRN:  [id number] PCP:  None Chief Complaint Patient presents with  Concussion  Migraine HISTORY OF PRESENT ILLNESS: 
Nolberto Baum is a 26 y.o., female who presents today for follow up for concussions. She had a first concussion in 2015, was hit in the right temple and developed nausea and fatigue but recovered a month later. She had a second concussion in 2017, she had her head on a brick fireplace with no loss of consciousness and she had trouble with vision, brain fog, balance issues, fatigue but recovered. In 2017, she hit the right side of her head against a shoulder of another player while diving for a ball. Had a fourth head injury when she was hit in the back of the head while hugging a friend in August of this year, developed nausea, fatigue, dizziness. The symptoms are better, and not why she is here today. She has seen another sport medicine doctor since then who does not think that she has had a concussion due to mechanism of potential injury. She developed a new tremor in August in both arms and legs. She notices this when she is lying in bed, feels like she is rocking. She also has a sensation of pins and needles in the right leg, it is transient, sometimes all the way up her leg. This started a few weeks ago, she has noticed it most when sitting cross legged, or after yoga. She does feel that her hips are tight. She had an episode of tachycardia, went to the ER at [place] [address], saw a cardiologist and no cause was identified. She wore a Holter monitor for a few weeks, echocardiogram was ordered but she decided not to pursue that. No orthostatic change in symptoms. She denies anxiety, was treated for anxiety as a child but generally does not feel anxious now.  She currently has no fatigue, mental fogginess, she is doing well in nursing school. She has an occasional migraine, maybe 2 per month and they respond well to Maxalt. She sleeps well, but is very for fatigue. Sleep schedule is somewhat erratic as she works rotating shifts. 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 No family history of tremor. No tremors with intention, she can start IVs without difficulty. She does not drink a lot of caffeine. Diet is good, she tries to eat unprocessed food, lots of fruits and vegetables. She is very physically active. 8.31.2018 recap This is a 26 y.o. four concussion. 1st concussion august 2015. Right temple developed nausea fatigue. Recovered a month later. Second concussion March 2017 hit her head on break fireplace no LOC. She had toruble with vision, brain fog, balance issues and fatigue, no tinnitus. Recovered. August 2017 she hit her right head against a shoulder of a player while diving for a ball. She became sonophobic, nauseous, needed a full month to recover. August fourth had a fourth concussion she was hit in the back of the head and developed nausea. She continues to struggle with some fatigue and dizziness. She has no visual issues. She feels she is improving.  
  
  
Current Outpatient Medications Medication Sig  TRI-LEGEST FE 1-20(5)/1-30(7) /1mg-35mcg (9) tab  rizatriptan (MAXALT-MLT) 10 mg disintegrating tablet No current facility-administered medications for this visit. Allergies Allergen Reactions  Sulfa (Sulfonamide Antibiotics) Rash Past Medical History:  
Diagnosis Date  ACL (anterior cruciate ligament) tear  Concussion Past Surgical History:  
Procedure Laterality Date  HX ACL RECONSTRUCTION Social History Socioeconomic History  Marital status: SINGLE Spouse name: Not on file  Number of children: Not on file  Years of education: Not on file  Highest education level: Not on file Social Needs  Financial resource strain: Not on file  Food insecurity - worry: Not on file  Food insecurity - inability: Not on file  Transportation needs - medical: Not on file  Transportation needs - non-medical: Not on file Occupational History  Not on file Tobacco Use  Smoking status: Never Smoker  Smokeless tobacco: Never Used Substance and Sexual Activity  Alcohol use: No  
 Drug use: No  
 Sexual activity: Not on file Other Topics Concern  Not on file Social History Narrative  Not on file Family History Problem Relation Age of Onset  Breast Cancer Mother PHYSICAL EXAMINATION:   
Visit Vitals /72 Pulse (!) 114 Resp 18 Ht 5' 3\" (1.6 m) Wt 60.3 kg (133 lb) SpO2 97% BMI 23.56 kg/m² General:  Well defined, nourished, and groomed individual in no acute distress. Neck: Supple, nontender, no bruits. Heart: Regular rate and rhythm, no murmurs, rub, or gallop. Normal S1S2. Lungs:  Clear to auscultation bilaterally with equal chest expansion, no cough, no wheeze Musculoskeletal:  Extremities revealed no edema and had full range of motion of joints. Psych:  Good mood and bright affect NEUROLOGICAL EXAMINATION:    
Mental Status:   Alert and oriented to person, place, and time with recent and remote memory intact. Attention span and concentration are normal. Speech is fluent with a full fund of knowledge. Cranial Nerves:   
II, III, IV, VI:  Visual acuity grossly intact. Extra-ocular movements are full and fluid. No ptosis or nystagmus. V-XII: Hearing is grossly intact. Facial features are symmetric, with normal sensation and strength. The palate rises symmetrically and the tongue protrudes midline. Sternocleidomastoids 5/5. Motor Examination: Normal tone, bulk, and strength, 5/5 muscle strength throughout. Sensory: Intact and equal to temp, vibration, light touch throughout Coordination:  Finger to nose testing was normal.   No resting or intention tremor Gait and Station:  Steady while walking and with tandem walking. Normal arm swing. No pronator drift. No muscle wasting or fasciculations noted. DTRs: 1+ and equal throughout ASSESSMENT AND PLAN 
  ICD-10-CM ICD-9-CM 1. Tremor R25.1 781.0 propranolol (INDERAL) 10 mg tablet MRI BRAIN W WO CONT 2. Numbness and tingling of right leg R20.0 782.0 MRI BRAIN W WO CONT  
 R20.2 3. Migraine without aura and without status migrainosus, not intractable G43.009 346.10   
 
21-year-old female previously seen for concussions with new complaint of tremor that started about a month ago, right leg transient numbness and tingling more recently. Tremor is throughout her whole body, and all 4 extremities. It sounds as if it happens at rest, rarely with intention. Right leg numbness is generally positional.  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 Saw cardiologist for tachycardia, workup did not provide diagnosis but those notes are unavailable. Reports that she had some lab work done in outside ER. Exam is nonfocal, no reproducible tremor. 1.  Discussed that symptoms are most likely triggered by stress, anxiety related to being a student and working rotating shifts. Migraine less likely cause, could potentially cause right leg numbness but should not cause tremor. We will get MRI of brain to rule out MS given new right leg numbness and tremor 2. Trial of propranolol 10 mg twice daily if tolerated, discussed potential side effects. She will monitor blood pressure and heart rate. This may not be helpful with diagnosis as it can treat tremor, anxiety, and also migraine 3. We will request records from ER at [address] [place] [address] and cardiologist that she saw there 4.  Suggested that she try to stop rotating, work a consistent schedule to help with stress Follow-up in 3 months, call sooner with concerns This note will not be viewable in [address] Danyel García NP This note was created using voice recognition software. Despite editing, there may be syntax errors.

## 2018-11-06 NOTE — PATIENT INSTRUCTIONS
Propranolol 10 mg tabs: Take 1 tab at bedtime initially, if tolerated increase to 1 tab twice daily. Monitor heart rate and blood pressure on this medication PRESCRIPTION REFILL POLICY [facility] Statement to Patients April 1, 2014 In an effort to ensure the large volume of patient prescription refills is processed in the most efficient and expeditious manner, we are asking our patients to assist us by calling your Pharmacy for all prescription refills, this will include also your  Mail Order Pharmacy. The pharmacy will contact our office electronically to continue the refill process. Please do not wait until the last minute to call your pharmacy. We need at least 48 hours (2days) to fill prescriptions. We also encourage you to call your pharmacy before going to  your prescription to make sure it is ready. With regard to controlled substance prescription refill requests (narcotic refills) that need to be picked up at our office, we ask your cooperation by providing us with at least 72 hours (3days) notice that you will need a refill. We will not refill narcotic prescription refill requests after 4:00pm on any weekday, Monday through Thursday, or after 2:00pm on Fridays, or on the weekends. We encourage everyone to explore another way of getting your prescription refill request processed using ApnaPais[employer], our patient web portal through our electronic medical record system. [employer] is an efficient and effective way to communicate your medication request directly to the office and  downloadable as an bárbara on your smart phone . [employer] also features a review functionality that allows you to view your medication list as well as leave messages for your physician. Are you ready to get connected? If so please review the attatched instructions or speak to any of our staff to get you set up right away! Thank you so much for your cooperation. Should you have any questions please contact our Practice Administrator. The Physicians and Staff,  [facility] A Healthy Lifestyle: Care Instructions Your Care Instructions A healthy lifestyle can help you feel good, stay at a healthy weight, and have plenty of energy for both work and play. A healthy lifestyle is something you can share with your whole family. A healthy lifestyle also can lower your risk for serious health problems, such as high blood pressure, heart disease, and diabetes. You can follow a few steps listed below to improve your health and the health of your family. Follow-up care is a key part of your treatment and safety. Be sure to make and go to all appointments, and call your doctor if you are having problems. It's also a good idea to know your test results and keep a list of the medicines you take. How can you care for yourself at home? · Do not eat too much sugar, fat, or fast foods. You can still have dessert and treats now and then. The goal is moderation. · Start small to improve your eating habits. Pay attention to portion sizes, drink less juice and soda pop, and eat more fruits and vegetables. ? Eat a healthy amount of food. A 3-ounce serving of meat, for example, is about the size of a deck of cards. Fill the rest of your plate with vegetables and whole grains. ? Limit the amount of soda and sports drinks you have every day. Drink more water when you are thirsty. ? Eat at least 5 servings of fruits and vegetables every day. It may seem like a lot, but it is not hard to reach this goal. A serving or helping is 1 piece of fruit, 1 cup of vegetables, or 2 cups of leafy, raw vegetables. Have an apple or some carrot sticks as an afternoon snack instead of a candy bar. Try to have fruits and/or vegetables at every meal. 
· Make exercise part of your daily routine.  You may want to start with simple activities, such as walking, bicycling, or slow swimming. Try to be active 30 to 60 minutes every day. You do not need to do all 30 to 60 minutes all at once. For example, you can exercise 3 times a day for 10 or 20 minutes. Moderate exercise is safe for most people, but it is always a good idea to talk to your doctor before starting an exercise program. 
· Keep moving. Glory Friday the lawn, work in the garden, or Kahnoodle. Take the stairs instead of the elevator at work. · If you smoke, quit. People who smoke have an increased risk for heart attack, stroke, cancer, and other lung illnesses. Quitting is hard, but there are ways to boost your chance of quitting tobacco for good. ? Use nicotine gum, patches, or lozenges. ? Ask your doctor about stop-smoking programs and medicines. ? Keep trying. In addition to reducing your risk of diseases in the future, you will notice some benefits soon after you stop using tobacco. If you have shortness of breath or asthma symptoms, they will likely get better within a few weeks after you quit. · Limit how much alcohol you drink. Moderate amounts of alcohol (up to 2 drinks a day for men, 1 drink a day for women) are okay. But drinking too much can lead to liver problems, high blood pressure, and other health problems. Family health If you have a family, there are many things you can do together to improve your health. · Eat meals together as a family as often as possible. · Eat healthy foods. This includes fruits, vegetables, lean meats and dairy, and whole grains. · Include your family in your fitness plan. Most people think of activities such as jogging or tennis as the way to fitness, but there are many ways you and your family can be more active. Anything that makes you breathe hard and gets your heart pumping is exercise. Here are some tips: 
? Walk to do errands or to take your child to school or the bus. ? Go for a family bike ride after dinner instead of watching TV. Where can you learn more? Go to http://tristen-johnny.info/. Enter W483 in the search box to learn more about \"A Healthy Lifestyle: Care Instructions. \" Current as of: December 7, 2017 Content Version: 11.8 © 9575-2904 [employer]. Care instructions adapted under license by [employer] (which disclaims liability or warranty for this information). If you have questions about a medical condition or this instruction, always ask your healthcare professional. [address] any warranty or liability for your use of this information.

## 2018-11-06 NOTE — PROGRESS NOTES
PT DAILY TREATMENT NOTE - [facility] 2-15 Patient Name: Josefina Arriaza Date:2018 : 1995 [x]  Patient  Verified Payor: BLUE CROSS / Plan: [facility] PPO / Product Type: PPO / In time:9:00am Out time:10:15am 
Total Treatment Time (min): 75 Total Timed Codes (min): 60 
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 Pt complains of increase in dizziness the past few days, which she attributes to allergies/sinuses. Pt has appointment with a new neurologist for evaluation of resting tremor; pt is fearful and concerned that she has MS. Pt is hopeful to get neurological cause from tremor ruled out. OBJECTIVE Modality rationale: decrease pain to improve the patients ability to look over shoulders Min Type Additional Details   
  
15 [x] Estim: []Att   [x]Unatt    []TENS instruct []IFC  [x]Premod   []NMES []Other:  []w/US   []w/ice   [x]w/heat Position: supine Location: neck/UT   
  
  []  Traction: [] Cervical       []Lumbar 
                     [] Prone          []Supine []Intermittent   []Continuous Lbs: 
[] before manual 
[] after manual 
[]w/heat   
  []  Ultrasound: []Continuous   [] Pulsed  
                    at: []1MHz   []3MHz Location: 
W/cm2:   
  [] Paraffin Location:  
[]w/heat   
  []  Ice     []  Heat 
[]  Ice massage Position: Location:   
  []  Laser 
[]  Other: Position: Location:   
  
   []  Vasopneumatic Device Pressure:       [] lo [] med [] hi  
Temperature:    
[x] Skin assessment post-treatment:  [x]intact []redness- no adverse reaction 
  []redness  adverse reaction:  
  
25 min Neuromuscular Reeducation:   
Rationale: increase ROM, increase strength, improve coordination, improve balance and increase proprioception to improve the patients ability to resume normal activity 
  
35 min Manual Therapy: STM bilateral UT, cervical paraspinal and sub-occipital regions, gentle cervical traction; suboccipital release, right SCM trigger point release, Upslide mobilization grade 3-4 C4/5-C6/7 R and L,  passive cervical rotation right and left Rationale: decrease pain, increase ROM and increase tissue extensibility to improve the patients ability to resume physical activity and look over shoulders 
  
With 
 [] TE 
 [] TA 
 [] neuro 
 [] other: Patient Education: [x] Review HEP   
[] Progressed/Changed HEP based on:  
[] positioning   [] body mechanics   [] transfers   [] heat/ice application   
[] other:   
  
Other Objective/Functional Measures: --         
  
Pain Level (0-10 scale) post treatment: 0/10 
  
ASSESSMENT/Changes in Function:  
Pt required re-instruction for proper technique of 1/2 [place] get-up exercise. Pt denied pain throughout the session. Pt continues to describe heightened anxiety and stress, but is trying to better-manage, by praying. Hypomobility noted in upper cervical spine- improved with active forward-nod exercise, supine- for proprioceptive feedback from pillow. Patient will continue to benefit from skilled PT services to modify and progress therapeutic interventions, address functional mobility deficits, address ROM deficits, address strength deficits, analyze and address soft tissue restrictions, analyze and cue movement patterns, analyze and modify body mechanics/ergonomics and assess and modify postural abnormalities to attain remaining goals.  
   
 []  See Plan of Care 
[]  See progress note/recertification 
[]  See Discharge Summary 
   
Progress towards goals / Updated goals: 
Short/Long Term Goals: To be accomplished in 6 weeks: 
1) Independent with HEP. MET 2) Tolerate return to physical activity without symptoms. progressing 3) Pt will report resolution of dizziness. 4) Pt will report improvement in overall functional mobility, as measured by FOTO, with an increased score of at least 17 points, from 58 to 75. 
  
PLAN [x]  Upgrade activities as tolerated     [x]  Continue plan of care 
[]  Update interventions per flow sheet      
[]  Discharge due to:_ 
[]  Other:_   
 
 
 
Annemarie Vasquez, PT, DPT  11/6/2018  1:30 PM

## 2018-11-06 NOTE — PROGRESS NOTES
Request for ER records from [facility] faxed to [facility] records department. Faxed a request for records from Dr. Masoud Ochoa office (cardiologist).

## 2018-11-07 ENCOUNTER — TELEPHONE (OUTPATIENT)
Dept: NEUROLOGY | Age: 23
End: 2018-11-07

## 2018-11-07 NOTE — TELEPHONE ENCOUNTER
Pt was wondering if she could get an MRI on her neck at the same time as the MRI that is already ordered for her.  Please call back

## 2018-11-07 NOTE — TELEPHONE ENCOUNTER
There is no reason to and I doubt insurance would approve it.   If there is any abnormality on MRI of brain, we will order additional testing if needed

## 2018-11-08 ENCOUNTER — HOSPITAL ENCOUNTER (OUTPATIENT)
Dept: PHYSICAL THERAPY | Age: 23
Discharge: HOME | End: 2018-11-08
Payer: COMMERCIAL

## 2018-11-08 PROCEDURE — 97110 THERAPEUTIC EXERCISES: CPT | Performed by: PHYSICAL THERAPIST

## 2018-11-08 PROCEDURE — 97140 MANUAL THERAPY 1/> REGIONS: CPT | Performed by: PHYSICAL THERAPIST

## 2018-11-08 PROCEDURE — 97014 ELECTRIC STIMULATION THERAPY: CPT | Performed by: PHYSICAL THERAPIST

## 2018-11-08 NOTE — PROGRESS NOTES
PT DAILY TREATMENT NOTE - [facility] 2-15 Patient Name: Herlinda Clay Date:2018 : 1995 [x]  Patient  Verified Payor: BLUE CROSS / Plan: [facility] PPO / Product Type: PPO / In time:340p  Out time:430p Total Treatment Time (min): 50 Total Timed Codes (min): 35 
1:1 Treatment Time ( only): 35 Visit #: 55 Treatment Area: concussion SUBJECTIVE Pain Level (0-10 scale): 1/10 Any medication changes, allergies to medications, adverse drug reactions, diagnosis change, or new procedure performed?: [x] No    [] Yes (see summary sheet for update) Subjective functional status/changes:   [] No changes reported Pt states that her neck is tight today from work. OBJECTIVE 
         
Modality rationale: decrease pain to improve the patients ability to look over shoulders Min Type Additional Details    
  
15 [x] Estim: []Att   [x]Unatt    []TENS instruct 
                []IFC  [x]Premod   []NMES   
                 []Other:  []w/US   []w/ice   [x]w/heat Position: supine Location: neck/UT    
  
  []  Traction: [] Cervical       []Lumbar 
                     [] Prone          []Supine 
                     []Intermittent   []Continuous Lbs: 
[] before manual 
[] after manual 
[]w/heat    
  []  Ultrasound: []Continuous   [] Pulsed  
                    MW: []6AOU   []3MHz Location: 
W/cm2:    
  [] Paraffin  
  Location:  
[]w/heat    
  []  Ice     []  Heat 
[]  Ice massage Position: Location:    
  []  Laser 
[]  Other: Position: Location:    
  
  []  Vasopneumatic Device Pressure:       [] lo [] med [] hi  
Temperature:     
[x] Skin assessment post-treatment:  [x]intact []redness- no adverse reaction 
  []redness  adverse reaction:  
  
10 min Therapeutic Exercise: Active cervical rotation training Rationale: increase ROM, increase strength, improve coordination, improve balance and increase proprioception to improve the patients ability to resume normal activity 
  
25 min Manual Therapy: STM bilateral UT, cervical paraspinal and sub-occipital regions, gentle cervical traction; suboccipital release, right SCM trigger point release, Upslide mobilization grade 3-4 C4/5-C6/7 R and L,  passive cervical rotation right and left Rationale: decrease pain, increase ROM and increase tissue extensibility to improve the patients ability to resume physical activity and look over shoulders 
  
With 
 [] TE 
 [] TA 
 [] neuro 
 [] other: Patient Education: [x] Review HEP   
[] Progressed/Changed HEP based on:  
[] positioning   [] body mechanics   [] transfers   [] heat/ice application   
[] other:   
  
Other Objective/Functional Measures: --         
  
Pain Level (0-10 scale) post treatment: 0/10 
  
ASSESSMENT/Changes in Function:  
Improved cervical mobility to full range in supine and approx 85% Range in seated.   Patient will continue to benefit from skilled PT services to modify and progress therapeutic interventions, address functional mobility deficits, address ROM deficits, address strength deficits, analyze and address soft tissue restrictions, analyze and cue movement patterns, analyze and modify body mechanics/ergonomics and assess and modify postural abnormalities to attain remaining goals. 
   
[]  See Plan of Care 
[]  See progress note/recertification 
[]  See Discharge Summary 
   
Progress towards goals / Updated goals: 
Short/Long Term Goals: To be accomplished in 6 weeks: 
1) Independent with HEP. MET 2) Tolerate return to physical activity without symptoms. progressing 3) Pt will report resolution of dizziness. 4) Pt will report improvement in overall functional mobility, as measured by FOTO, with an increased score of at least 17 points, from 58 to 75. 
  
PLAN [x]  Upgrade activities as tolerated     [x]  Continue plan of care 
[]  Update interventions per flow sheet      
[]  Discharge due to:_ 
[]  Other:_   
 
 
 
 Jesus Pineda, PT, DPT 11/8/2018  5:17 PM

## 2018-11-08 NOTE — TELEPHONE ENCOUNTER
Spoke with patient and informed her of Elvia's recommendations. Patient was given an opportunity to ask questions, repeated information, and verbalized understanding.

## 2018-11-13 ENCOUNTER — APPOINTMENT (OUTPATIENT)
Dept: PHYSICAL THERAPY | Age: 23
End: 2018-11-13
Payer: COMMERCIAL

## 2018-11-15 ENCOUNTER — HOSPITAL ENCOUNTER (OUTPATIENT)
Dept: PHYSICAL THERAPY | Age: 23
Discharge: HOME | End: 2018-11-15
Payer: COMMERCIAL

## 2018-11-15 PROCEDURE — 97014 ELECTRIC STIMULATION THERAPY: CPT | Performed by: PHYSICAL THERAPIST

## 2018-11-15 PROCEDURE — 97140 MANUAL THERAPY 1/> REGIONS: CPT | Performed by: PHYSICAL THERAPIST

## 2018-11-15 NOTE — PROGRESS NOTES
PT DAILY TREATMENT NOTE - [facility] 2-15 Patient Name: Dee Florez Date:11/15/2018 : 1995 [x]  Patient  Verified Payor: BLUE CROSS / Plan: [facility] PPO / Product Type: PPO / In time:335p  Out time:430p Total Treatment Time (min): 50 Total Timed Codes (min): 35 
1:1 Treatment Time ( only): 30 Visit #: 52 Treatment Area: Concussion [S06.0X9A] Neck pain [M54.2] SUBJECTIVE Pain Level (0-10 scale): 1/10 Any medication changes, allergies to medications, adverse drug reactions, diagnosis change, or new procedure performed?: [x] No    [] Yes (see summary sheet for update) Subjective functional status/changes:   [] No changes reported Pt states that her neck is tight today. OBJECTIVE 
  
          
Modality rationale: decrease pain to improve the patients ability to look over shoulders Min Type Additional Details    
  
15 [x] Estim: []Att   [x]Unatt    []TENS instruct 
                []IFC  [x]Premod   []NMES   
                 []Other:  []w/US   []w/ice   [x]w/heat Position: supine Location: neck/UT    
  
  []  Traction: [] Cervical       []Lumbar 
                     [] Prone          []Supine 
                     []Intermittent   []Continuous Lbs: 
[] before manual 
[] after manual 
[]w/heat    
  []  Ultrasound: []Continuous   [] Pulsed  
                    AT: []6DHV   []3MHz Location: 
W/cm2:    
  [] Paraffin  
  Location:  
[]w/heat    
  []  Ice     []  Heat 
[]  Ice massage Position: Location:    
  []  Laser 
[]  Other: Position: Location:    
  
  []  Vasopneumatic Device Pressure:       [] lo [] med [] hi  
Temperature:     
[x] Skin assessment post-treatment:  [x]intact []redness- no adverse reaction 
  []redness  adverse reaction:  
  
  
35 min Manual Therapy: STM bilateral UT, cervical paraspinal and sub-occipital regions, gentle cervical traction; suboccipital release, right SCM trigger point release, Upslide mobilization grade 3-4 C4/5-C6/7 R and L,  passive cervical rotation right and left Rationale: decrease pain, increase ROM and increase tissue extensibility to improve the patients ability to resume physical activity and look over shoulders 
  
With 
 [] TE 
 [] TA 
 [] neuro 
 [] other: Patient Education: [x] Review HEP   
[] Progressed/Changed HEP based on:  
[] positioning   [] body mechanics   [] transfers   [] heat/ice application   
[] other:   
  
Other Objective/Functional Measures: --         
  
Pain Level (0-10 scale) post treatment: 0/10 
  
ASSESSMENT/Changes in Function:  
Active cervical mobility improves with treatment.   Patient will continue to benefit from skilled PT services to modify and progress therapeutic interventions, address functional mobility deficits, address ROM deficits, address strength deficits, analyze and address soft tissue restrictions, analyze and cue movement patterns, analyze and modify body mechanics/ergonomics and assess and modify postural abnormalities to attain remaining goals. 
   
[]  See Plan of Care 
[]  See progress note/recertification 
[]  See Discharge Summary 
   
Progress towards goals / Updated goals: 
Short/Long Term Goals: To be accomplished in 6 weeks: 
1) Independent with HEP. MET 2) Tolerate return to physical activity without symptoms. progressing 3) Pt will report resolution of dizziness. 4) Pt will report improvement in overall functional mobility, as measured by FOTO, with an increased score of at least 17 points, from 58 to 75. 
  
PLAN [x]  Upgrade activities as tolerated     [x]  Continue plan of care 
[]  Update interventions per flow sheet      
[]  Discharge due to:_ 
[]  Other:_   
 
 
 
Aubrey Puente PT, DPT 11/15/2018  5:08 PM

## 2018-11-16 ENCOUNTER — HOSPITAL ENCOUNTER (OUTPATIENT)
Dept: MRI IMAGING | Age: 23
Discharge: HOME | End: 2018-11-16
Attending: NURSE PRACTITIONER
Payer: COMMERCIAL

## 2018-11-16 VITALS — WEIGHT: 133 LBS | BODY MASS INDEX: 23.56 KG/M2

## 2018-11-16 DIAGNOSIS — R20.0 NUMBNESS AND TINGLING OF RIGHT LEG: ICD-10-CM

## 2018-11-16 DIAGNOSIS — R25.1 TREMOR: ICD-10-CM

## 2018-11-16 DIAGNOSIS — R20.2 NUMBNESS AND TINGLING OF RIGHT LEG: ICD-10-CM

## 2018-11-16 PROCEDURE — 70553 MRI BRAIN STEM W/O & W/DYE: CPT

## 2018-11-16 PROCEDURE — A9575 INJ GADOTERATE MEGLUMI 0.1ML: HCPCS | Performed by: NURSE PRACTITIONER

## 2018-11-16 PROCEDURE — 74011250636 HC RX REV CODE- 250/636: Performed by: NURSE PRACTITIONER

## 2018-11-16 RX ORDER — GADOTERATE MEGLUMINE 376.9 MG/ML
12 INJECTION INTRAVENOUS
Status: COMPLETED | OUTPATIENT
Start: 2018-11-16 | End: 2018-11-16

## 2018-11-16 RX ADMIN — GADOTERATE MEGLUMINE 12 ML: 376.9 INJECTION INTRAVENOUS at 11:47

## 2018-11-19 ENCOUNTER — TELEPHONE (OUTPATIENT)
Dept: NEUROLOGY | Age: 23
End: 2018-11-19

## 2018-11-19 ENCOUNTER — HOSPITAL ENCOUNTER (OUTPATIENT)
Dept: PHYSICAL THERAPY | Age: 23
Discharge: HOME | End: 2018-11-19
Payer: COMMERCIAL

## 2018-11-19 PROCEDURE — 97110 THERAPEUTIC EXERCISES: CPT | Performed by: PHYSICAL THERAPIST

## 2018-11-19 PROCEDURE — 97140 MANUAL THERAPY 1/> REGIONS: CPT | Performed by: PHYSICAL THERAPIST

## 2018-11-19 NOTE — PROGRESS NOTES
PT DAILY TREATMENT NOTE - [facility] 2-15 Patient Name: Herlinda Clay Date:2018 : 1995 [x]  Patient  Verified Payor: BLUE CROSS / Plan: [facility] PPO / Product Type: PPO / In time:310p  Out time:400p Total Treatment Time (min): 50 Total Timed Codes (min): 50 
1:1 Treatment Time ([facility] only): 50 Visit #: 76 Treatment Area: Concussion [S06.0X9A] Neck pain [M54.2] SUBJECTIVE Pain Level (0-10 scale): 0/10 Any medication changes, allergies to medications, adverse drug reactions, diagnosis change, or new procedure performed?: [x] No    [] Yes (see summary sheet for update) Subjective functional status/changes:   [] No changes reported Pt reports that her neck is feeling a lot better and now is just limiting at end range of motion like when she was taking vitals and checking the monitor. OBJECTIVE 20 min Therapeutic Exercise: Instruction and training for towel stretching, self rotation stretching and AROM Rotation Rationale: increase ROM, increase strength, improve coordination, improve balance and increase proprioception to improve the patients ability to resume normal activity 
  
25 min Manual Therapy: STM bilateral UT, cervical paraspinal and sub-occipital regions, gentle cervical traction; suboccipital release, right SCM trigger point release, Upslide mobilization grade 3-4 C4/5-C6/7 R and L,  passive cervical rotation right and left Rationale: decrease pain, increase ROM and increase tissue extensibility to improve the patients ability to resume physical activity and look over shoulders 
  
With 
 [] TE 
 [] TA 
 [] neuro 
 [] other: Patient Education: [x] Review HEP   
[] Progressed/Changed HEP based on:  
[] positioning   [] body mechanics   [] transfers   [] heat/ice application   
[] other:   
  
Other Objective/Functional Measures: --  Pre-treat Cervical AROM L 80 R 70 Post-Treat Cervical AROM  L 85  R 85       
  
 Pain Level (0-10 scale) post treatment: 0/10 
  
ASSESSMENT/Changes in Function:  
Continues to show progress with active cervical ROM and overall activity tolerance.    Patient will continue to benefit from skilled PT services to modify and progress therapeutic interventions, address functional mobility deficits, address ROM deficits, address strength deficits, analyze and address soft tissue restrictions, analyze and cue movement patterns, analyze and modify body mechanics/ergonomics and assess and modify postural abnormalities to attain remaining goals. 
   
[]  See Plan of Care 
[]  See progress note/recertification 
[]  See Discharge Summary 
   
Progress towards goals / Updated goals: 
Short/Long Term Goals: To be accomplished in 6 weeks: 
1) Independent with HEP. MET 2) Tolerate return to physical activity without symptoms. progressing 3) Pt will report resolution of dizziness. Progressing 4) Pt will report improvement in overall functional mobility, as measured by FOTO, with an increased score of at least 17 points, from 58 to 75. 
  
PLAN [x]  Upgrade activities as tolerated     [x]  Continue plan of care 
[]  Update interventions per flow sheet      
[]  Discharge due to:_ 
[]  Other:_   
 
 
 
Sukhi Puente, PT, DPT 11/19/2018  3:14 PM

## 2018-11-19 NOTE — TELEPHONE ENCOUNTER
----- Message from Luis Albertobenedicto Sebas sent at 11/19/2018  9:47 AM EST -----  Regarding: JESSY Maya/telephone  Pt (p) [phone], calling to see if her MRI results are back yet.

## 2018-11-21 NOTE — TELEPHONE ENCOUNTER
Spoke with patient and informed her that, per [facility], MRI was normal. Patient was given an opportunity to ask questions, repeated information, and verbalized understanding.

## 2018-11-26 ENCOUNTER — HOSPITAL ENCOUNTER (OUTPATIENT)
Dept: PHYSICAL THERAPY | Age: 23
Discharge: HOME | End: 2018-11-26
Payer: COMMERCIAL

## 2018-11-26 PROCEDURE — 97110 THERAPEUTIC EXERCISES: CPT | Performed by: PHYSICAL THERAPIST

## 2018-11-26 PROCEDURE — 97112 NEUROMUSCULAR REEDUCATION: CPT | Performed by: PHYSICAL THERAPIST

## 2018-11-26 NOTE — PROGRESS NOTES
PT DAILY TREATMENT NOTE - [facility] 2-15 Patient Name: Micaela Seth Date:2018 : 1995 [x]  Patient  Verified Payor: BLUE CROSS / Plan: [facility] PPO / Product Type: PPO / In time:400p  Out time:455p Total Treatment Time (min): 55 Total Timed Codes (min): 55 
1:1 Treatment Time ( only): 55 Visit #: 12 Treatment Area: Concussion [S06.0X9A] Neck pain [M54.2] SUBJECTIVE Pain Level (0-10 scale): 0/10 Any medication changes, allergies to medications, adverse drug reactions, diagnosis change, or new procedure performed?: [x] No    [] Yes (see summary sheet for update) Subjective functional status/changes:   [] No changes reported Pt states that her MRI was normal.  She feels like she felt that the relaxation helped and she did not have any dizziness. She did have some soreness from swimming and turning her head to breath. She states that there seems to be a stress component that is contributing with her neck tension/tightness. OBJECTIVE 
 
 
  
35 min Therapeutic Exercise:  assessment of current status; discussion regarding progression of HEP. Rationale: increase ROM, increase strength, improve coordination, improve balance and increase proprioception to improve the patients ability to resume normal activity 20 min  Neuromuscular Reeducation: postural awareness/correction techiques Rationale: increase ROM, increase strength, improve coordination, improve balance and increase proprioception to improve the patients ability to resume normal activity 
  
  
With 
 [] TE 
 [] TA 
 [] neuro 
 [] other: Patient Education: [x] Review HEP   
[] Progressed/Changed HEP based on:  
[] positioning   [] body mechanics   [] transfers   [] heat/ice application   
[] other:   
  
Other Objective/Functional Measures: --  Pre-treat Cervical AROM L 88 R 82 Post-Treat Cervical AROM  L 90 R 88 FOTO Functional Measure: Intake 58/100    Discharge 84/100 
  
 Pain Level (0-10 scale) post treatment: 0/10 
  
ASSESSMENT/Changes in Function:  
Able to resolve end range cervical rotation stiffness with self management techniques. Reviewed postural awareness strategies. Demonstrated correct upper/lower trunk coordination for 1/2 Somali get up. []  See Plan of Care 
[]  See progress note/recertification [x]  See Discharge Summary 
   
Progress towards goals / Updated goals: 
Short/Long Term Goals: To be accomplished in 6 weeks: 
1) Independent with HEP. MET 2) Tolerate return to physical activity without symptoms. MET 
3) Pt will report resolution of dizziness. MET 
4) Pt will report improvement in overall functional mobility, as measured by FOTO, with an increased score of at least 17 points, from 58 to 75.   MET 
  
PLAN 
[]  Upgrade activities as tolerated     []  Continue plan of care 
[]  Update interventions per flow sheet      
[x]  Discharge due to:_ Completion of goals 
[]  Other:_   
  
 
 
 
Prince Edgar, PT, DPT 11/26/2018  4:07 PM

## 2018-11-26 NOTE — ANCILLARY DISCHARGE INSTRUCTIONS
[facility] [address] [address], [address] [facility] Phone: [phone]  Fax: [phone] Discharge Summary  2-15 Patient name: Sara Pablo  : 1995  Provider#: [id number] Referral source: Shar Lowe DO Medical/Treatment Diagnosis: Concussion [S06.0X9A] Neck pain [M54.2] Prior Hospitalization: see medical history Comorbidities: 4 concussions since ; right CN IV palsy, tachycardia, SOB; completes 20 minutes of exercise at least 3x/week Prior Level of Function: full-time college student; part-time tech in [employer] Medications: Verified on Patient Summary List 
  
Start of Care: 10/02/2018                                                          Onset Date: 2018 Visits from Start of Care: 13     Missed Visits: 0 Reporting Period : 10/02/2018 to 2018 Progress towards goals / Updated goals: 
Short/Long Term Goals: To be accomplished in 6 weeks: 
1) Independent with HEP. MET 2) Tolerate return to physical activity without symptoms. MET 
3) Pt will report resolution of dizziness. MET 
4) Pt will report improvement in overall functional mobility, as measured by FOTO, with an increased score of at least 17 points, from 58 to 75. MET 
  
 
ASSESSMENT/SUMMARY OF CARE:  Ac Fu states that she is no longer experiencing any dizzyness symptoms and that she states that there seems to be a stress component that is contributing with her neck tension/tightness. She reported no symptoms while away on vacation for a few days last week. She has been instructed in a specific strengthening, stretching and postural awareness exercises to continue on her own. Cervical passive intervertebral mobility is normal and she has full recovery of active mobility. Cervical AROM  L 90 R 88 FOTO Functional Measure: Intake 58/100    Discharge 84/100 RECOMMENDATIONS: 
[x]Discontinue therapy: [x]Patient has reached or is progressing toward set goals []Patient is non-compliant or has abdicated 
    []Due to lack of appreciable progress towards set goals Lana Buckley, PT, DPT 11/26/2018 4:52 PM

## 2018-11-29 ENCOUNTER — APPOINTMENT (OUTPATIENT)
Dept: PHYSICAL THERAPY | Age: 23
End: 2018-11-29
Payer: COMMERCIAL

## 2019-01-07 DIAGNOSIS — R25.1 TREMOR: ICD-10-CM

## 2019-01-07 RX ORDER — PROPRANOLOL HYDROCHLORIDE 10 MG/1
10 TABLET ORAL 2 TIMES DAILY
Qty: 180 TAB | Refills: 1 | Status: SHIPPED | OUTPATIENT
Start: 2019-01-07 | End: 2019-11-19 | Stop reason: SDUPTHER

## 2019-01-07 NOTE — TELEPHONE ENCOUNTER
Received a fax from [employer] requesting a 90 day supply for propranolol 10 mg.     Last office visit: 11/6/18  Next office visit: 2/6/19

## 2019-04-16 ENCOUNTER — TELEPHONE (OUTPATIENT)
Dept: NEUROLOGY | Age: 24
End: 2019-04-16

## 2019-04-16 NOTE — TELEPHONE ENCOUNTER
* Message from Alva below:    ----- Message from Js Anderson sent at 4/16/2019 12:58 PM EDT -----  Regarding: JESSY Maya/ Telephone  Patient needs a refill on her Riazatr[facility]an called to [address] [place] [address], [address].  Would like a call back on [phone]

## 2019-04-17 NOTE — TELEPHONE ENCOUNTER
Not able to leave a message the patient's mailbox is full  (need to verify the dosage amount that the patient is taking for this medication)

## 2019-04-18 ENCOUNTER — TELEPHONE (OUTPATIENT)
Dept: NEUROLOGY | Age: 24
End: 2019-04-18

## 2019-04-18 NOTE — TELEPHONE ENCOUNTER
* Message from Alva below:      ----- Message from Len Alvares sent at 4/18/2019 12:05 PM EDT -----  Regarding: JESSY Maya/Telephone  Pt returning call to practice regarding [phone].

## 2019-04-19 NOTE — TELEPHONE ENCOUNTER
----- Message from Metro Pop sent at 4/19/2019  3:14 PM EDT -----  Regarding: Dr Leonarda Worley  Pt (p) [phone],[id number] was returning the nurses call., from today

## 2019-04-24 NOTE — TELEPHONE ENCOUNTER
Patient requested a refill for this medication    No future appointments. Last Appointment My Department:  Visit date not found    Would you like to refill below? Requested Prescriptions     Pending Prescriptions Disp Refills    rizatriptan (MAXALT-MLT) 10 mg disintegrating tablet 9 Tab 3     Sig: Take 1 Tab by mouth once as needed for Migraine for up to 1 dose.

## 2019-04-26 RX ORDER — RIZATRIPTAN BENZOATE 10 MG/1
10 TABLET, ORALLY DISINTEGRATING ORAL
Qty: 9 TAB | Refills: 3 | Status: SHIPPED | OUTPATIENT
Start: 2019-04-26 | End: 2019-04-26

## 2019-11-19 DIAGNOSIS — R25.1 TREMOR: ICD-10-CM

## 2019-11-19 RX ORDER — PROPRANOLOL HYDROCHLORIDE 10 MG/1
10 TABLET ORAL 2 TIMES DAILY
Qty: 180 TAB | Refills: 1 | Status: SHIPPED | OUTPATIENT
Start: 2019-11-19

## 2021-05-27 ENCOUNTER — OFFICE VISIT (OUTPATIENT)
Dept: NEUROLOGY | Age: 26
End: 2021-05-27
Payer: COMMERCIAL

## 2021-05-27 VITALS
RESPIRATION RATE: 16 BRPM | OXYGEN SATURATION: 99 % | HEIGHT: 63 IN | WEIGHT: 140 LBS | SYSTOLIC BLOOD PRESSURE: 118 MMHG | BODY MASS INDEX: 24.8 KG/M2 | DIASTOLIC BLOOD PRESSURE: 62 MMHG | HEART RATE: 76 BPM

## 2021-05-27 DIAGNOSIS — S06.0X0A CONCUSSION WITHOUT LOSS OF CONSCIOUSNESS, INITIAL ENCOUNTER: Primary | ICD-10-CM

## 2021-05-27 DIAGNOSIS — G43.719 INTRACTABLE CHRONIC MIGRAINE WITHOUT AURA AND WITHOUT STATUS MIGRAINOSUS: ICD-10-CM

## 2021-05-27 PROCEDURE — 99214 OFFICE O/P EST MOD 30 MIN: CPT | Performed by: PSYCHIATRY & NEUROLOGY

## 2021-05-27 RX ORDER — RIZATRIPTAN BENZOATE 10 MG/1
10 TABLET ORAL
COMMUNITY
End: 2021-05-27 | Stop reason: SDUPTHER

## 2021-05-27 RX ORDER — RIZATRIPTAN BENZOATE 10 MG/1
10 TABLET ORAL
Qty: 12 TABLET | Refills: 5 | Status: SHIPPED | OUTPATIENT
Start: 2021-05-27 | End: 2021-05-27

## 2021-05-27 NOTE — PROGRESS NOTES
Follow up    Name Timothy Castillo Age 26 y.o. MRN [id number]  1995     Referring Physician: None      Chief Complaint:  Concussion     This is a 26 y.o. Right handed female with a history of migraines SHe was pulling a backboard out of an ambulance and knocked her on the head (24th of . Other than the immediate pain she did not feel bad. The next day she felt headaches and fatigue and sensitive to loud noises. She has some dizziness with sudden movement. She has not been taking medications for this and has been resting. No memory issues. Assessment and Plan  1. Mild concussion  May return to work. 2. Intractable chronic migraine without aura and without status migrainosus    - rizatriptan (MAXALT) 10 mg tablet; Take 1 Tablet by mouth once as needed for Migraine for up to 1 dose. May repeat in 2 hours if needed  Dispense: 12 Tablet; Refill: 5       Allergies   Allergen Reactions    Gabapentin Hives    Sulfa (Sulfonamide Antibiotics) Rash           Current Outpatient Medications   Medication Sig    propranolol (INDERAL) 10 mg tablet Take 1 Tab by mouth two (2) times a day.  TRI-LEGEST FE 1-20(5)/1-30(7) /1mg-35mcg (9) tab      No current facility-administered medications for this visit. Past Medical History:   Diagnosis Date    ACL (anterior cruciate ligament) tear     Concussion         Social History     Tobacco Use    Smoking status: Never Smoker    Smokeless tobacco: Never Used   Substance Use Topics    Alcohol use: No    Drug use: No        Review of Systems   Eyes: Negative for blurred vision and double vision. Respiratory: Negative for cough and shortness of breath. Cardiovascular: Negative for chest pain, palpitations and orthopnea. Gastrointestinal: Negative for nausea and vomiting. Neurological: Positive for headaches. Negative for dizziness. Psychiatric/Behavioral: Negative for depression. The patient is not nervous/anxious.           Exam  Visit Vitals  BP 118/62 (BP 1 Location: Left upper arm, BP Patient Position: Sitting, BP Cuff Size: Adult)   Pulse 76   Resp 16   Ht 5' 3\" (1.6 m)   Wt 140 lb (63.5 kg)   LMP 05/23/2021   SpO2 99%   BMI 24.80 kg/m²         General: Well developed, well nourished. Patient in no distress   Head: Normocephalic, atraumatic, anicteric sclera  Eyes: Undialated exam of the optic discs revealed sharp discs with no hemorrhage or exudate. Neck Normal ROM, No thyromegally   Lungs:  Clear to auscultation    Cardiac: Regular rate and rhythm with no murmurs. Abd: Bowel sounds were audible   Ext: No pedal edema   Skin: Supple no rash     NeurologicExam:  Mental Status: Alert and oriented to person place and time   Speech: Fluent no aphasia or dysarthria. Cranial Nerves:   II-XII Intact    Motor:  Full and symmetric strength of upper and lower ext . Normal bulk and tone. Reflexes:   Deep tendon reflexes 2+/4 and symmetric. Sensory:   Symmetric and intact    Gait:  Gait is balanced    Tremor:   No tremor noted. Cerebellar:  Coordination intact. Neurovascular: No carotid bruits.  No JVD

## 2021-05-27 NOTE — LETTER
5/27/2021 11:55 AM 
 
Ms. Leelee Osborne [phone] To whom it may concern, 
 
Ms. José Rai was determined to have suffered a medical setback and was advised not to return to work from May 27th to the 29th. Sincerely, Heather Lewis MD

## 2021-06-25 ENCOUNTER — TELEPHONE (OUTPATIENT)
Dept: NEUROLOGY | Age: 26
End: 2021-06-25

## 2021-06-25 NOTE — TELEPHONE ENCOUNTER
Confirmed patient id and she states that she does not have a pcp. Advised that Dr Go Delarosa is out of the office until Monday. Patient states that she is looking for vestibular treatment and it will be fine to hear from Dr Go Delarosa next week. If not vestibular treatment would like some options.

## 2021-06-25 NOTE — TELEPHONE ENCOUNTER
----- Message from [employer] sent at 6/25/2021  2:27 PM EDT -----  Regarding: /Telephone     Caller's first and last name:Pt  Reason for call: resources about her vertigo   Callback required yes/no and why:Yes  Best contact number(s):[phone]  Details to clarify the request:Pt had a concussion a month ago and has been dealing with vertigo ever since so she wants to know if the doctor has any resources for her.

## 2021-06-29 DIAGNOSIS — S06.0X0A CONCUSSION WITHOUT LOSS OF CONSCIOUSNESS, INITIAL ENCOUNTER: Primary | ICD-10-CM
